# Patient Record
Sex: FEMALE | Race: WHITE | NOT HISPANIC OR LATINO | Employment: OTHER | URBAN - METROPOLITAN AREA
[De-identification: names, ages, dates, MRNs, and addresses within clinical notes are randomized per-mention and may not be internally consistent; named-entity substitution may affect disease eponyms.]

---

## 2021-01-12 ENCOUNTER — OFFICE VISIT (OUTPATIENT)
Dept: URBAN - METROPOLITAN AREA CLINIC 68 | Facility: CLINIC | Age: 81
End: 2021-01-12

## 2021-01-22 ENCOUNTER — OFFICE VISIT (OUTPATIENT)
Dept: URBAN - METROPOLITAN AREA SURGERY CENTER 12 | Facility: SURGERY CENTER | Age: 81
End: 2021-01-22

## 2022-06-04 ENCOUNTER — TELEPHONE ENCOUNTER (OUTPATIENT)
Dept: URBAN - METROPOLITAN AREA CLINIC 68 | Facility: CLINIC | Age: 82
End: 2022-06-04

## 2022-06-04 RX ORDER — SODIUM SULFATE, POTASSIUM SULFATE, MAGNESIUM SULFATE 17.5; 3.13; 1.6 G/ML; G/ML; G/ML
SOLUTION, CONCENTRATE ORAL AS DIRECTED
Qty: 1 | Refills: 0 | OUTPATIENT
Start: 2021-01-12 | End: 2021-01-13

## 2022-06-04 RX ORDER — POLYETHYLENE GLYCOL 3350, SODIUM SULFATE, SODIUM CHLORIDE, POTASSIUM CHLORIDE, ASCORBIC ACID, SODIUM ASCORBATE 7.5-2.691G
KIT ORAL
Qty: 1 | Refills: 0 | OUTPATIENT
Start: 2015-01-27 | End: 2021-01-12

## 2022-06-05 ENCOUNTER — TELEPHONE ENCOUNTER (OUTPATIENT)
Dept: URBAN - METROPOLITAN AREA CLINIC 68 | Facility: CLINIC | Age: 82
End: 2022-06-05

## 2022-06-05 RX ORDER — TERIPARATIDE 250 UG/ML
FORTEO( 600MCG/2.4ML SUBCUTANEOUS   ) ACTIVE -HX ENTRY INJECTION, SOLUTION SUBCUTANEOUS
Status: ACTIVE | COMMUNITY
Start: 2021-01-12

## 2022-06-05 RX ORDER — ESTRADIOL 10 UG/1
VAGIFEM( 10MCG VAGINAL  TWO TIMES A WEEK ) ACTIVE -HX ENTRY INSERT VAGINAL
Status: ACTIVE | COMMUNITY
Start: 2021-01-12

## 2022-06-05 RX ORDER — LOSARTAN POTASSIUM 25 MG/1
LOSARTAN POTASSIUM( 25MG ORAL   ) ACTIVE -HX ENTRY TABLET ORAL
Status: ACTIVE | COMMUNITY
Start: 2021-01-12

## 2022-06-25 ENCOUNTER — TELEPHONE ENCOUNTER (OUTPATIENT)
Age: 82
End: 2022-06-25

## 2022-06-25 RX ORDER — POLYETHYLENE GLYCOL 3350, SODIUM SULFATE, SODIUM CHLORIDE, POTASSIUM CHLORIDE, ASCORBIC ACID, SODIUM ASCORBATE 7.5-2.691G
KIT ORAL
Qty: 1 | Refills: 0 | OUTPATIENT
Start: 2015-01-27 | End: 2021-01-12

## 2022-06-25 RX ORDER — SODIUM SULFATE, POTASSIUM SULFATE, MAGNESIUM SULFATE 17.5; 3.13; 1.6 G/ML; G/ML; G/ML
SOLUTION, CONCENTRATE ORAL AS DIRECTED
Qty: 1 | Refills: 0 | OUTPATIENT
Start: 2021-01-12 | End: 2021-01-13

## 2022-06-25 RX ORDER — SODIUM SULFATE, POTASSIUM SULFATE, MAGNESIUM SULFATE 17.5; 3.13; 1.6 G/ML; G/ML; G/ML
SOLUTION, CONCENTRATE ORAL AS DIRECTED
Qty: 1 | Refills: 0 | OUTPATIENT
Start: 2011-11-15 | End: 2011-11-16

## 2022-06-26 ENCOUNTER — TELEPHONE ENCOUNTER (OUTPATIENT)
Age: 82
End: 2022-06-26

## 2022-06-26 RX ORDER — LOSARTAN POTASSIUM 25 MG/1
LOSARTAN POTASSIUM( 25MG ORAL   ) ACTIVE -HX ENTRY TABLET, FILM COATED ORAL
Status: ACTIVE | COMMUNITY
Start: 2021-01-12

## 2022-06-26 RX ORDER — TERIPARATIDE 250 UG/ML
FORTEO( 600MCG/2.4ML SUBCUTANEOUS   ) ACTIVE -HX ENTRY INJECTION, SOLUTION SUBCUTANEOUS
Status: ACTIVE | COMMUNITY
Start: 2021-01-12

## 2022-06-26 RX ORDER — DEXTROMETHORPHAN POLISTIREX 30 MG/5 ML
CALCIUM-D( 270-170-400MG-MG-IU ORAL  DAILY ) ACTIVE -HX ENTRY SUSPENSION, EXTENDED RELEASE 12 HR ORAL DAILY
Status: ACTIVE | COMMUNITY
Start: 2021-01-12

## 2022-06-26 RX ORDER — ESTRADIOL 10 UG/1
VAGIFEM( 10MCG VAGINAL  TWO TIMES A WEEK ) ACTIVE -HX ENTRY INSERT VAGINAL
Status: ACTIVE | COMMUNITY
Start: 2021-01-12

## 2022-06-26 RX ORDER — CHOLECALCIFEROL (VITAMIN D3) 25 MCG
VITAMIN D( 1000UNIT ORAL  DAILY ) ACTIVE -HX ENTRY TABLET ORAL DAILY
Status: ACTIVE | COMMUNITY
Start: 2021-01-12

## 2023-01-04 ENCOUNTER — OFFICE VISIT (OUTPATIENT)
Dept: URBAN - METROPOLITAN AREA CLINIC 68 | Facility: CLINIC | Age: 83
End: 2023-01-04

## 2023-01-04 RX ORDER — LOSARTAN POTASSIUM 25 MG/1
LOSARTAN POTASSIUM( 25MG ORAL   ) ACTIVE -HX ENTRY TABLET ORAL
Status: ACTIVE | COMMUNITY
Start: 2021-01-12

## 2023-01-04 RX ORDER — TERIPARATIDE 250 UG/ML
FORTEO( 600MCG/2.4ML SUBCUTANEOUS   ) ACTIVE -HX ENTRY INJECTION, SOLUTION SUBCUTANEOUS
Status: ACTIVE | COMMUNITY
Start: 2021-01-12

## 2023-01-04 RX ORDER — ESTRADIOL 10 UG/1
VAGIFEM( 10MCG VAGINAL  TWO TIMES A WEEK ) ACTIVE -HX ENTRY INSERT VAGINAL
Status: ON HOLD | COMMUNITY
Start: 2021-01-12

## 2023-01-04 NOTE — HPI-MIGRATED HPI
General : Colonoscopy  history of IBS, long string of relatives with IBS, had colectomy Son resection for diverticulitis recent  30 yrs of IBS , diverticulitis 6 yrs ago, A lot of stress On the road for 3 months  Attack  of constipation  Constipation since a child History of  Episodes of constipation ,then clean outs after can get vomiting Pain and blood noted in stool last month, took antibiotics for 20 days  Takes teas Metamucil BID, avoid raw vegetables

## 2023-03-07 ENCOUNTER — OFFICE VISIT (OUTPATIENT)
Dept: URBAN - METROPOLITAN AREA CLINIC 68 | Facility: CLINIC | Age: 83
End: 2023-03-07

## 2023-03-07 RX ORDER — TERIPARATIDE 250 UG/ML
FORTEO( 600MCG/2.4ML SUBCUTANEOUS   ) ACTIVE -HX ENTRY INJECTION, SOLUTION SUBCUTANEOUS
Status: ACTIVE | COMMUNITY
Start: 2021-01-12

## 2023-03-07 RX ORDER — ESTRADIOL 10 UG/1
VAGIFEM( 10MCG VAGINAL  TWO TIMES A WEEK ) ACTIVE -HX ENTRY INSERT VAGINAL
Status: ON HOLD | COMMUNITY
Start: 2021-01-12

## 2023-03-07 RX ORDER — LOSARTAN POTASSIUM 25 MG/1
LOSARTAN POTASSIUM( 25MG ORAL   ) ACTIVE -HX ENTRY TABLET ORAL
Status: ACTIVE | COMMUNITY
Start: 2021-01-12

## 2023-03-07 NOTE — HPI-MIGRATED HPI
General : Prebiotics ,\ 4gm s  Usually constipation , can get one hour of diarrhea and spasm over one hour gets clean out syndrome ever 3-4 weeks , then has vomiting and then" done"  last egd 4 yrs ago, Mild Jalloh s  later doubted diagnosis  Vomiting one episode Does read from a list ,   Colonoscopy in the past  history of IBS, long string of relatives with IBS, had colectomy Son resection for diverticulitis recent  30 yrs of IBS , diverticulitis 6 yrs ago, A lot of stress On the road for 3 months  Attack  of constipation since a child History of  recurrent Episodes of constipation ,then clean outs after can get vomiting Pain and blood noted in stool last month, took antibiotics for 20 days  Takes teas Metamucil BID, avoid raw vegetables   Son had bowel resection , mom and cousin had attacks  as well

## 2023-03-20 ENCOUNTER — TELEPHONE ENCOUNTER (OUTPATIENT)
Dept: URBAN - METROPOLITAN AREA CLINIC 68 | Facility: CLINIC | Age: 83
End: 2023-03-20

## 2023-11-03 ENCOUNTER — OFFICE VISIT (OUTPATIENT)
Dept: URBAN - METROPOLITAN AREA TELEHEALTH 1 | Facility: TELEHEALTH | Age: 83
End: 2023-11-03
Payer: MEDICARE

## 2023-11-03 DIAGNOSIS — R19.7: ICD-10-CM

## 2023-11-03 DIAGNOSIS — K59.04 CHRONIC IDIOPATHIC CONSTIPATION: ICD-10-CM

## 2023-11-03 DIAGNOSIS — K57.92 DIVERTICULITIS: ICD-10-CM

## 2023-11-03 PROCEDURE — 99442 PHONE E/M BY PHYS 11-20 MIN: CPT | Performed by: SPECIALIST

## 2023-11-03 RX ORDER — TERIPARATIDE 250 UG/ML
FORTEO( 600MCG/2.4ML SUBCUTANEOUS   ) ACTIVE -HX ENTRY INJECTION, SOLUTION SUBCUTANEOUS
Status: ACTIVE | COMMUNITY
Start: 2021-01-12

## 2023-11-03 RX ORDER — LOSARTAN POTASSIUM 25 MG/1
LOSARTAN POTASSIUM( 25MG ORAL   ) ACTIVE -HX ENTRY TABLET ORAL
Status: ACTIVE | COMMUNITY
Start: 2021-01-12

## 2023-11-03 RX ORDER — ESTRADIOL 10 UG/1
VAGIFEM( 10MCG VAGINAL  TWO TIMES A WEEK ) ACTIVE -HX ENTRY INSERT VAGINAL
Status: ON HOLD | COMMUNITY
Start: 2021-01-12

## 2023-11-03 NOTE — HPI-MIGRATED HPI
11/3   TELEVISIT  * pt developed worseing consitpation  had CT scan showed no obstruction , possible diverticultis  improved on treatment  BMs still multiple scybyllus stools  Alot of stressdue to moving and disruptoin in mealtimes and hydration Better slightly now  No bleeding some small BMs      Prior  General : Prebiotics ,\ 4gm s  Usually constipation , can get one hour of diarrhea and spasm over one hour gets clean out syndrome ever 3-4 weeks , then has vomiting and then" done"  last egd 4 yrs ago, Mild Jalloh s  later doubted diagnosis  Vomiting one episode Does read from a list ,   Colonoscopy in the past  history of IBS, long string of relatives with IBS, had colectomy Son resection for diverticulitis recent  30 yrs of IBS , diverticulitis 6 yrs ago, A lot of stress On the road for 3 months  Attack  of constipation since a child History of  recurrent Episodes of constipation ,then clean outs after can get vomiting Pain and blood noted in stool last month, took antibiotics for 20 days  Takes teas Metamucil BID, avoid raw vegetables   Son had bowel resection , mom and cousin had attacks  as well

## 2023-11-05 PROBLEM — 82934008: Status: ACTIVE | Noted: 2023-11-05

## 2024-02-26 ENCOUNTER — OV EP (OUTPATIENT)
Dept: URBAN - METROPOLITAN AREA CLINIC 68 | Facility: CLINIC | Age: 84
End: 2024-02-26

## 2024-02-26 VITALS
SYSTOLIC BLOOD PRESSURE: 122 MMHG | WEIGHT: 147 LBS | BODY MASS INDEX: 25.1 KG/M2 | DIASTOLIC BLOOD PRESSURE: 82 MMHG | HEIGHT: 64 IN

## 2024-02-26 PROBLEM — 235595009: Status: ACTIVE | Noted: 2024-02-26

## 2024-02-26 RX ORDER — FAMOTIDINE 20 MG/1
1 TABLET TABLET, FILM COATED ORAL TWICE A DAY
Qty: 60 TABLET | Refills: 11 | OUTPATIENT
Start: 2024-02-26

## 2024-02-26 RX ORDER — TERIPARATIDE 250 UG/ML
FORTEO( 600MCG/2.4ML SUBCUTANEOUS   ) ACTIVE -HX ENTRY INJECTION, SOLUTION SUBCUTANEOUS
Status: DISCONTINUED | COMMUNITY
Start: 2021-01-12

## 2024-02-26 RX ORDER — LOSARTAN POTASSIUM 25 MG/1
LOSARTAN POTASSIUM( 25MG ORAL   ) ACTIVE -HX ENTRY TABLET ORAL
Status: ACTIVE | COMMUNITY
Start: 2021-01-12

## 2024-02-26 RX ORDER — METRONIDAZOLE 250 MG/1
1 TABLET TABLET ORAL
Qty: 28 TABLET | Refills: 1 | OUTPATIENT
Start: 2024-02-26 | End: 2024-03-11

## 2024-02-26 RX ORDER — ESTRADIOL 10 UG/1
VAGIFEM( 10MCG VAGINAL  TWO TIMES A WEEK ) ACTIVE -HX ENTRY INSERT VAGINAL
Status: ON HOLD | COMMUNITY
Start: 2021-01-12

## 2024-02-26 RX ORDER — CIPROFLOXACIN HYDROCHLORIDE 500 MG/1
1 TABLET TABLET, FILM COATED ORAL
Qty: 14 TABLET | Refills: 1 | OUTPATIENT
Start: 2024-02-26 | End: 2024-03-11

## 2024-02-26 NOTE — HPI-MIGRATED HPI
2/26/24  ONE clean out episode with pain and diarrhea  Metamucil one and half teaspoons , better  follows restricted diet, does not drink much         11/3   TELEVISIT  * pt developed worsening constipation  had CT scan showed no obstruction, possible diverticulitis  improved on treatment  BM's still multiple scybalous stools  Alot of stress due to moving and disruption in mealtimes and hydration Better slightly now  No bleeding some small BM's      Prior  General : Prebiotics,\ 4gm s  Usually constipation, can get one hour of diarrhea and spasm over one hour gets clean out syndrome ever 3-4 weeks, then has vomiting and then" done"  last EGD 4 yrs ago, Mild Jalloh s  later doubted diagnosis  Vomiting one episode Does read from a list,   Colonoscopy in the past  history of IBS, long string of relatives with IBS, had colectomy Son resection for diverticulitis recent  30 yrs of IBS, diverticulitis 6 yrs ago, A lot of stress On the road for 3 months  Attack  of constipation since a child History of  recurrent Episodes of constipation,then clean outs after can get vomiting Pain and blood noted in stool last month, took antibiotics for 20 days  Takes teas Metamucil BID, avoid raw vegetables   Son had bowel resection, mom and cousin had attacks  as well

## 2024-04-08 ENCOUNTER — OV EP (OUTPATIENT)
Dept: URBAN - METROPOLITAN AREA CLINIC 68 | Facility: CLINIC | Age: 84
End: 2024-04-08
Payer: MEDICARE

## 2024-04-08 VITALS
BODY MASS INDEX: 25.27 KG/M2 | OXYGEN SATURATION: 99 % | SYSTOLIC BLOOD PRESSURE: 130 MMHG | HEART RATE: 69 BPM | HEIGHT: 64 IN | DIASTOLIC BLOOD PRESSURE: 80 MMHG | WEIGHT: 148 LBS

## 2024-04-08 DIAGNOSIS — R19.7: ICD-10-CM

## 2024-04-08 DIAGNOSIS — K59.04 CHRONIC IDIOPATHIC CONSTIPATION: ICD-10-CM

## 2024-04-08 DIAGNOSIS — K21.9 CHRONIC GERD: ICD-10-CM

## 2024-04-08 DIAGNOSIS — K57.92 DIVERTICULITIS: ICD-10-CM

## 2024-04-08 PROCEDURE — 99214 OFFICE O/P EST MOD 30 MIN: CPT | Performed by: SPECIALIST

## 2024-04-08 RX ORDER — FAMOTIDINE 20 MG/1
1 TABLET TABLET, FILM COATED ORAL TWICE A DAY
OUTPATIENT
Start: 2024-02-26

## 2024-04-08 RX ORDER — FAMOTIDINE 20 MG/1
1 TABLET TABLET, FILM COATED ORAL TWICE A DAY
Qty: 60 TABLET | Refills: 11 | Status: ACTIVE | COMMUNITY
Start: 2024-02-26

## 2024-04-08 RX ORDER — COLESEVELAM HYDROCHLORIDE 625 MG/1
2 TABLETS TABLET, FILM COATED ORAL TWICE A DAY
Qty: 120 TABLET | Refills: 11 | OUTPATIENT
Start: 2024-04-08

## 2024-04-08 RX ORDER — LOSARTAN POTASSIUM 25 MG/1
LOSARTAN POTASSIUM( 25MG ORAL   ) ACTIVE -HX ENTRY TABLET ORAL
Status: ACTIVE | COMMUNITY
Start: 2021-01-12

## 2024-04-08 NOTE — HPI-MIGRATED HPI
4/8  stress at home  is a professor and now with dementia  had 3 episodes of clean outs  chronic constpatgio since shildhood has a daily BM remembers mother and cousins constantly discussing bowel problems  Pt feels has to ne a genetic predispositonj to bowel promeble Beans and apples are helpful for constipation but avoids these due to what shes read thease are not good   REC welchol bid increase as needed  use apples and beans  and oatmeals and appples  check stools   2/26/24  ONE clean out episode with pain and diarrhea  Metamucil one and half teaspoons , better  follows restricted diet, does not drink much         11/3   TELEVISIT  * pt developed worsening constipation  had CT scan showed no obstruction, possible diverticulitis  improved on treatment  BM's still multiple scybalous stools  Alot of stress due to moving and disruption in mealtimes and hydration Better slightly now  No bleeding some small BM's      Prior  General : Prebiotics,\ 4gm s  Usually constipation, can get one hour of diarrhea and spasm over one hour gets clean out syndrome ever 3-4 weeks, then has vomiting and then" done"  last EGD 4 yrs ago, Mild Jalloh s  later doubted diagnosis  Vomiting one episode Does read from a list,   Colonoscopy in the past  history of IBS, long string of relatives with IBS, had colectomy Son resection for diverticulitis recent  30 yrs of IBS, diverticulitis 6 yrs ago, A lot of stress On the road for 3 months  Attack  of constipation since a child History of  recurrent Episodes of constipation,then clean outs after can get vomiting Pain and blood noted in stool last month, took antibiotics for 20 days  Takes teas Metamucil BID, avoid raw vegetables   Son had bowel resection, mom and cousin had attacks  as well

## 2025-01-15 ENCOUNTER — OFFICE VISIT (OUTPATIENT)
Dept: URBAN - METROPOLITAN AREA CLINIC 68 | Facility: CLINIC | Age: 85
End: 2025-01-15
Payer: MEDICARE

## 2025-01-15 ENCOUNTER — DASHBOARD ENCOUNTERS (OUTPATIENT)
Age: 85
End: 2025-01-15

## 2025-01-15 VITALS
BODY MASS INDEX: 26.29 KG/M2 | WEIGHT: 154 LBS | HEIGHT: 64 IN | SYSTOLIC BLOOD PRESSURE: 120 MMHG | DIASTOLIC BLOOD PRESSURE: 80 MMHG

## 2025-01-15 DIAGNOSIS — R19.7: ICD-10-CM

## 2025-01-15 DIAGNOSIS — K57.92 DIVERTICULITIS: ICD-10-CM

## 2025-01-15 DIAGNOSIS — K21.9 CHRONIC GERD: ICD-10-CM

## 2025-01-15 DIAGNOSIS — K59.04 CHRONIC IDIOPATHIC CONSTIPATION: ICD-10-CM

## 2025-01-15 PROCEDURE — 99213 OFFICE O/P EST LOW 20 MIN: CPT | Performed by: SPECIALIST

## 2025-01-15 RX ORDER — FAMOTIDINE 20 MG/1
1 TABLET TABLET, FILM COATED ORAL TWICE A DAY
OUTPATIENT

## 2025-01-15 RX ORDER — COLESEVELAM HYDROCHLORIDE 625 MG/1
2 TABLETS TABLET, FILM COATED ORAL TWICE A DAY
Qty: 120 TABLET | Refills: 11 | Status: ON HOLD | COMMUNITY
Start: 2024-04-08

## 2025-01-15 RX ORDER — LOSARTAN POTASSIUM 25 MG/1
LOSARTAN POTASSIUM( 25MG ORAL   ) ACTIVE -HX ENTRY TABLET ORAL
Status: ACTIVE | COMMUNITY
Start: 2021-01-12

## 2025-01-15 RX ORDER — FAMOTIDINE 20 MG/1
1 TABLET TABLET, FILM COATED ORAL TWICE A DAY
Status: ON HOLD | COMMUNITY
Start: 2024-02-26

## 2025-01-15 NOTE — HPI-MIGRATED HPI
1/15/ slill  abd pain, lasted 3 hrs only, BM not regular, can skip 5-7 days  follows diet, better when takes more water,   IMP  Diverticulosis no active infection Constipation needs regualr colace and metamucil pt admits to skipping and erratic diet and water and fiber supplement Last colon 2021 no polyps   IMP Diverticulosis no flare up , Constipation , needs daily fiber water and colace add miralax as needed consider colon in 2026 not clearly indicated for surveillence     4/8  stress at home  is a professor and now with dementia  had 3 episodes of clean outs  chronic constipation since childhood has a daily BM remembers mother and cousins constantly discussing bowel problems  Pt feels has to ne a genetic predisposition to bowel probable Beans and apples are helpful for constipation but avoids these due to what she's read these are not good   REC Welchol bid increase as needed  to use apples and beans  and oatmeal and apples  check stools   2/26/24  ONE clean out episode with pain and diarrhea  Metamucil one and half teaspoons, better  follows restricted diet, does not drink much         11/3   TELEVISION  * pt developed worsening constipation  had CT scan showed no obstruction, possible diverticulitis  improved on treatment  BM's still multiple scybalous stools  A lot of stress due to moving and disruption in mealtimes and hydration Better slightly now  No bleeding some small BM's      Prior  General : Prebiotics,\ 4gm s  Usually constipation, can get one hour of diarrhea and spasm over one hour gets clean out syndrome ever 3-4 weeks, then has vomiting and then" done"  last EGD 4 yrs ago, Mild Jalloh s  later doubted diagnosis  Vomiting one episode Does read from a list,   Colonoscopy in the past  history of IBS, long string of relatives with IBS, had colectomy Son resection for diverticulitis recent  30 yrs of IBS, diverticulitis 6 yrs ago, A lot of stress On the road for 3 months  Attack  of constipation since a child History of  recurrent Episodes of constipation, then clean outs after can get vomiting Pain and blood noted in stool last month, took antibiotics for 20 days  Takes teas Metamucil BID, avoid raw vegetables   Son had bowel resection, mom and cousin had attacks  as well

## 2025-02-07 ENCOUNTER — TELEPHONE ENCOUNTER (OUTPATIENT)
Dept: URBAN - METROPOLITAN AREA CLINIC 68 | Facility: CLINIC | Age: 85
End: 2025-02-07

## 2025-02-20 ENCOUNTER — TELEPHONE ENCOUNTER (OUTPATIENT)
Dept: URBAN - METROPOLITAN AREA CLINIC 68 | Facility: CLINIC | Age: 85
End: 2025-02-20

## 2025-02-20 RX ORDER — OMEPRAZOLE 10 MG/1
1 CAPSULE 1/2 TO 1 HOUR BEFORE MORNING MEAL CAPSULE, DELAYED RELEASE ORAL ONCE A DAY
Qty: 90 CAPSULES | Refills: 0

## 2025-05-06 ENCOUNTER — ERX REFILL RESPONSE (OUTPATIENT)
Dept: URBAN - METROPOLITAN AREA CLINIC 68 | Facility: CLINIC | Age: 85
End: 2025-05-06

## 2025-05-06 RX ORDER — OMEPRAZOLE 10 MG/1
TAKE 1 CAPSULE BY MOUTH ONCE DAILY 1/2 TO 1 HOUR BEFORE MORNING MEAL CAPSULE, DELAYED RELEASE ORAL
Qty: 90 CAPSULE | Refills: 2